# Patient Record
Sex: FEMALE | Race: WHITE | URBAN - METROPOLITAN AREA
[De-identification: names, ages, dates, MRNs, and addresses within clinical notes are randomized per-mention and may not be internally consistent; named-entity substitution may affect disease eponyms.]

---

## 2017-01-05 ENCOUNTER — COMMUNICATION - HEALTHEAST (OUTPATIENT)
Dept: ENDOCRINOLOGY | Facility: CLINIC | Age: 47
End: 2017-01-05

## 2017-01-11 ENCOUNTER — COMMUNICATION - HEALTHEAST (OUTPATIENT)
Dept: INTERNAL MEDICINE | Facility: CLINIC | Age: 47
End: 2017-01-11

## 2017-01-14 ENCOUNTER — COMMUNICATION - HEALTHEAST (OUTPATIENT)
Dept: INTERNAL MEDICINE | Facility: CLINIC | Age: 47
End: 2017-01-14

## 2017-01-31 ENCOUNTER — RECORDS - HEALTHEAST (OUTPATIENT)
Dept: ADMINISTRATIVE | Facility: OTHER | Age: 47
End: 2017-01-31

## 2017-02-02 ENCOUNTER — AMBULATORY - HEALTHEAST (OUTPATIENT)
Dept: NURSING | Facility: CLINIC | Age: 47
End: 2017-02-02

## 2017-02-02 DIAGNOSIS — D72.819 LEUKOPENIA, UNSPECIFIED TYPE: ICD-10-CM

## 2017-02-06 ENCOUNTER — COMMUNICATION - HEALTHEAST (OUTPATIENT)
Dept: INTERNAL MEDICINE | Facility: CLINIC | Age: 47
End: 2017-02-06

## 2017-02-06 DIAGNOSIS — Z79.890 HORMONE REPLACEMENT THERAPY (HRT): ICD-10-CM

## 2017-02-23 ENCOUNTER — COMMUNICATION - HEALTHEAST (OUTPATIENT)
Dept: SCHEDULING | Facility: CLINIC | Age: 47
End: 2017-02-23

## 2017-02-23 DIAGNOSIS — E53.8 FOLIC ACID DEFICIENCY: ICD-10-CM

## 2017-02-24 ENCOUNTER — COMMUNICATION - HEALTHEAST (OUTPATIENT)
Dept: INTERNAL MEDICINE | Facility: CLINIC | Age: 47
End: 2017-02-24

## 2017-02-24 DIAGNOSIS — E53.8 FOLIC ACID DEFICIENCY: ICD-10-CM

## 2017-03-28 ENCOUNTER — COMMUNICATION - HEALTHEAST (OUTPATIENT)
Dept: INTERNAL MEDICINE | Facility: CLINIC | Age: 47
End: 2017-03-28

## 2017-03-28 ENCOUNTER — AMBULATORY - HEALTHEAST (OUTPATIENT)
Dept: LAB | Facility: CLINIC | Age: 47
End: 2017-03-28

## 2017-03-28 DIAGNOSIS — Z79.01 LONG TERM (CURRENT) USE OF ANTICOAGULANTS: ICD-10-CM

## 2017-03-28 DIAGNOSIS — D64.9 ANEMIA, UNSPECIFIED: ICD-10-CM

## 2017-03-28 DIAGNOSIS — E11.9 TYPE 2 DIABETES MELLITUS (H): ICD-10-CM

## 2017-03-28 LAB — HBA1C MFR BLD: 7.5 % (ref 3.5–6)

## 2017-03-29 ENCOUNTER — COMMUNICATION - HEALTHEAST (OUTPATIENT)
Dept: INTERNAL MEDICINE | Facility: CLINIC | Age: 47
End: 2017-03-29

## 2017-03-31 LAB
ALBUMIN PERCENT: 59.8 % (ref 51–67)
ALBUMIN SERPL ELPH-MCNC: 4.1 G/DL (ref 3.2–4.7)
ALPHA 1 PERCENT: 2.6 % (ref 2–4)
ALPHA 2 PERCENT: 8.7 % (ref 5–13)
ALPHA1 GLOB SERPL ELPH-MCNC: 0.2 G/DL (ref 0.1–0.3)
ALPHA2 GLOB SERPL ELPH-MCNC: 0.6 G/DL (ref 0.4–0.9)
B-GLOBULIN SERPL ELPH-MCNC: 0.7 G/DL (ref 0.7–1.2)
BETA PERCENT: 9.9 % (ref 10–17)
GAMMA GLOB SERPL ELPH-MCNC: 1.3 G/DL (ref 0.6–1.4)
GAMMA GLOBULIN PERCENT: 19 % (ref 9–20)
M PROTEIN SERPL ELPH-MCNC: 0.5 G/DL
PATH ICD:: ABNORMAL
PATH ICD:: NORMAL
PROT PATTERN SERPL ELPH-IMP: ABNORMAL
PROT PATTERN SERPL IFE-IMP: NORMAL
PROT SERPL-MCNC: 6.9 G/DL (ref 6–8)
REVIEWING PATHOLOGIST: ABNORMAL
REVIEWING PATHOLOGIST: NORMAL

## 2017-04-03 ENCOUNTER — COMMUNICATION - HEALTHEAST (OUTPATIENT)
Dept: INTERNAL MEDICINE | Facility: CLINIC | Age: 47
End: 2017-04-03

## 2017-04-06 ENCOUNTER — COMMUNICATION - HEALTHEAST (OUTPATIENT)
Dept: INTERNAL MEDICINE | Facility: CLINIC | Age: 47
End: 2017-04-06

## 2017-04-10 ENCOUNTER — COMMUNICATION - HEALTHEAST (OUTPATIENT)
Dept: INTERNAL MEDICINE | Facility: CLINIC | Age: 47
End: 2017-04-10

## 2017-04-10 DIAGNOSIS — J30.9 ALLERGIC RHINITIS: ICD-10-CM

## 2017-04-14 ENCOUNTER — COMMUNICATION - HEALTHEAST (OUTPATIENT)
Dept: INTERNAL MEDICINE | Facility: CLINIC | Age: 47
End: 2017-04-14

## 2017-04-14 DIAGNOSIS — Z51.81 MEDICATION MONITORING ENCOUNTER: ICD-10-CM

## 2017-04-17 ENCOUNTER — COMMUNICATION - HEALTHEAST (OUTPATIENT)
Dept: INTERNAL MEDICINE | Facility: CLINIC | Age: 47
End: 2017-04-17

## 2017-04-20 ENCOUNTER — COMMUNICATION - HEALTHEAST (OUTPATIENT)
Dept: INTERNAL MEDICINE | Facility: CLINIC | Age: 47
End: 2017-04-20

## 2017-04-20 DIAGNOSIS — M06.9 RHEUMATOID ARTHRITIS (H): ICD-10-CM

## 2017-05-16 ENCOUNTER — COMMUNICATION - HEALTHEAST (OUTPATIENT)
Dept: INTERNAL MEDICINE | Facility: CLINIC | Age: 47
End: 2017-05-16

## 2017-05-16 ENCOUNTER — COMMUNICATION - HEALTHEAST (OUTPATIENT)
Dept: INFUSION THERAPY | Age: 47
End: 2017-05-16

## 2017-05-16 DIAGNOSIS — M81.0 OSTEOPOROSIS: ICD-10-CM

## 2017-06-01 ENCOUNTER — COMMUNICATION - HEALTHEAST (OUTPATIENT)
Dept: INTERNAL MEDICINE | Facility: CLINIC | Age: 47
End: 2017-06-01

## 2017-06-19 ENCOUNTER — COMMUNICATION - HEALTHEAST (OUTPATIENT)
Dept: INTERNAL MEDICINE | Facility: CLINIC | Age: 47
End: 2017-06-19

## 2017-07-03 ENCOUNTER — COMMUNICATION - HEALTHEAST (OUTPATIENT)
Dept: INTERNAL MEDICINE | Facility: CLINIC | Age: 47
End: 2017-07-03

## 2017-07-14 ENCOUNTER — COMMUNICATION - HEALTHEAST (OUTPATIENT)
Dept: INTERNAL MEDICINE | Facility: CLINIC | Age: 47
End: 2017-07-14

## 2017-07-14 DIAGNOSIS — K21.00 REFLUX ESOPHAGITIS: ICD-10-CM

## 2017-07-18 ENCOUNTER — AMBULATORY - HEALTHEAST (OUTPATIENT)
Dept: INFUSION THERAPY | Age: 47
End: 2017-07-18

## 2017-07-18 DIAGNOSIS — M81.0 OSTEOPOROSIS: ICD-10-CM

## 2017-07-21 ENCOUNTER — RECORDS - HEALTHEAST (OUTPATIENT)
Dept: ADMINISTRATIVE | Facility: OTHER | Age: 47
End: 2017-07-21

## 2017-07-24 ENCOUNTER — COMMUNICATION - HEALTHEAST (OUTPATIENT)
Dept: INTERNAL MEDICINE | Facility: CLINIC | Age: 47
End: 2017-07-24

## 2017-07-24 DIAGNOSIS — S89.91XA RIGHT KNEE INJURY, INITIAL ENCOUNTER: ICD-10-CM

## 2017-07-26 ENCOUNTER — RECORDS - HEALTHEAST (OUTPATIENT)
Dept: ADMINISTRATIVE | Facility: OTHER | Age: 47
End: 2017-07-26

## 2017-07-31 ENCOUNTER — RECORDS - HEALTHEAST (OUTPATIENT)
Dept: ADMINISTRATIVE | Facility: OTHER | Age: 47
End: 2017-07-31

## 2017-08-10 ENCOUNTER — COMMUNICATION - HEALTHEAST (OUTPATIENT)
Dept: INTERNAL MEDICINE | Facility: CLINIC | Age: 47
End: 2017-08-10

## 2017-08-10 DIAGNOSIS — E53.8 FOLIC ACID DEFICIENCY: ICD-10-CM

## 2017-08-16 ENCOUNTER — RECORDS - HEALTHEAST (OUTPATIENT)
Dept: MAMMOGRAPHY | Facility: CLINIC | Age: 47
End: 2017-08-16

## 2017-08-16 ENCOUNTER — RECORDS - HEALTHEAST (OUTPATIENT)
Dept: ADMINISTRATIVE | Facility: OTHER | Age: 47
End: 2017-08-16

## 2017-08-16 DIAGNOSIS — Z12.31 ENCOUNTER FOR SCREENING MAMMOGRAM FOR MALIGNANT NEOPLASM OF BREAST: ICD-10-CM

## 2017-08-22 ENCOUNTER — OFFICE VISIT - HEALTHEAST (OUTPATIENT)
Dept: INTERNAL MEDICINE | Facility: CLINIC | Age: 47
End: 2017-08-22

## 2017-08-22 ENCOUNTER — COMMUNICATION - HEALTHEAST (OUTPATIENT)
Dept: SCHEDULING | Facility: CLINIC | Age: 47
End: 2017-08-22

## 2017-08-22 ENCOUNTER — COMMUNICATION - HEALTHEAST (OUTPATIENT)
Dept: TELEHEALTH | Facility: CLINIC | Age: 47
End: 2017-08-22

## 2017-08-22 ENCOUNTER — AMBULATORY - HEALTHEAST (OUTPATIENT)
Dept: INTERNAL MEDICINE | Facility: CLINIC | Age: 47
End: 2017-08-22

## 2017-08-22 DIAGNOSIS — M06.9 RHEUMATOID ARTHRITIS (H): ICD-10-CM

## 2017-08-22 DIAGNOSIS — M81.0 OSTEOPOROSIS: ICD-10-CM

## 2017-08-22 DIAGNOSIS — M35.89: ICD-10-CM

## 2017-08-22 DIAGNOSIS — Z90.710 S/P TOTAL HYSTERECTOMY: ICD-10-CM

## 2017-08-22 DIAGNOSIS — K21.00 REFLUX ESOPHAGITIS: ICD-10-CM

## 2017-08-22 DIAGNOSIS — D69.6 THROMBOCYTOPENIA, UNSPECIFIED (H): ICD-10-CM

## 2017-08-22 DIAGNOSIS — I27.89 OTHER CHRONIC PULMONARY HEART DISEASES: ICD-10-CM

## 2017-08-22 DIAGNOSIS — J30.9 ALLERGIC RHINITIS: ICD-10-CM

## 2017-08-22 DIAGNOSIS — G47.00 INSOMNIA: ICD-10-CM

## 2017-08-22 DIAGNOSIS — D64.9 ANEMIA, UNSPECIFIED: ICD-10-CM

## 2017-08-22 DIAGNOSIS — D72.819 LEUKOPENIA, UNSPECIFIED TYPE: ICD-10-CM

## 2017-08-22 DIAGNOSIS — Z79.01 LONG TERM (CURRENT) USE OF ANTICOAGULANTS: ICD-10-CM

## 2017-08-22 DIAGNOSIS — E11.9 TYPE 2 DIABETES MELLITUS (H): ICD-10-CM

## 2017-08-22 DIAGNOSIS — Z79.890 HORMONE REPLACEMENT THERAPY (HRT): ICD-10-CM

## 2017-08-22 DIAGNOSIS — E53.8 FOLIC ACID DEFICIENCY: ICD-10-CM

## 2017-08-22 DIAGNOSIS — Z51.81 MEDICATION MONITORING ENCOUNTER: ICD-10-CM

## 2017-08-22 LAB — HBA1C MFR BLD: 7 % (ref 3.5–6)

## 2017-08-23 LAB
CHOLEST SERPL-MCNC: 167 MG/DL
HDLC SERPL-MCNC: 45 MG/DL
LDLC SERPL CALC-MCNC: 68 MG/DL
TRIGL SERPL-MCNC: 271 MG/DL

## 2017-08-24 ENCOUNTER — COMMUNICATION - HEALTHEAST (OUTPATIENT)
Dept: INTERNAL MEDICINE | Facility: CLINIC | Age: 47
End: 2017-08-24

## 2017-08-24 ENCOUNTER — RECORDS - HEALTHEAST (OUTPATIENT)
Dept: ADMINISTRATIVE | Facility: OTHER | Age: 47
End: 2017-08-24

## 2017-08-24 ENCOUNTER — INFUSION - HEALTHEAST (OUTPATIENT)
Dept: INFUSION THERAPY | Age: 47
End: 2017-08-24

## 2017-08-24 DIAGNOSIS — M81.0 OSTEOPOROSIS: ICD-10-CM

## 2017-08-29 ENCOUNTER — COMMUNICATION - HEALTHEAST (OUTPATIENT)
Dept: INTERNAL MEDICINE | Facility: CLINIC | Age: 47
End: 2017-08-29

## 2017-08-29 ENCOUNTER — RECORDS - HEALTHEAST (OUTPATIENT)
Dept: ADMINISTRATIVE | Facility: OTHER | Age: 47
End: 2017-08-29

## 2017-09-13 ENCOUNTER — RECORDS - HEALTHEAST (OUTPATIENT)
Dept: ADMINISTRATIVE | Facility: OTHER | Age: 47
End: 2017-09-13

## 2017-09-14 ENCOUNTER — COMMUNICATION - HEALTHEAST (OUTPATIENT)
Dept: INTERNAL MEDICINE | Facility: CLINIC | Age: 47
End: 2017-09-14

## 2017-09-16 ENCOUNTER — COMMUNICATION - HEALTHEAST (OUTPATIENT)
Dept: INTERNAL MEDICINE | Facility: CLINIC | Age: 47
End: 2017-09-16

## 2017-09-16 DIAGNOSIS — I10 HTN (HYPERTENSION): ICD-10-CM

## 2017-10-23 ENCOUNTER — COMMUNICATION - HEALTHEAST (OUTPATIENT)
Dept: INTERNAL MEDICINE | Facility: CLINIC | Age: 47
End: 2017-10-23

## 2017-10-28 ENCOUNTER — COMMUNICATION - HEALTHEAST (OUTPATIENT)
Dept: INTERNAL MEDICINE | Facility: CLINIC | Age: 47
End: 2017-10-28

## 2017-10-28 DIAGNOSIS — J30.9 ALLERGIC RHINITIS: ICD-10-CM

## 2017-11-02 ENCOUNTER — RECORDS - HEALTHEAST (OUTPATIENT)
Dept: ADMINISTRATIVE | Facility: OTHER | Age: 47
End: 2017-11-02

## 2017-12-05 ENCOUNTER — THERAPY VISIT (OUTPATIENT)
Dept: PHYSICAL THERAPY | Facility: CLINIC | Age: 47
End: 2017-12-05
Payer: COMMERCIAL

## 2017-12-05 DIAGNOSIS — M25.561 RIGHT KNEE PAIN: Primary | ICD-10-CM

## 2017-12-05 DIAGNOSIS — S82.143A TIBIAL PLATEAU FRACTURE: ICD-10-CM

## 2017-12-05 PROCEDURE — 97161 PT EVAL LOW COMPLEX 20 MIN: CPT | Mod: GP | Performed by: PHYSICAL THERAPIST

## 2017-12-05 PROCEDURE — 97110 THERAPEUTIC EXERCISES: CPT | Mod: GP | Performed by: PHYSICAL THERAPIST

## 2017-12-05 ASSESSMENT — ACTIVITIES OF DAILY LIVING (ADL)
SIT WITH YOUR KNEE BENT: ACTIVITY IS NOT DIFFICULT
GO UP STAIRS: ACTIVITY IS MINIMALLY DIFFICULT
RISE FROM A CHAIR: ACTIVITY IS NOT DIFFICULT
HOW_WOULD_YOU_RATE_THE_OVERALL_FUNCTION_OF_YOUR_KNEE_DURING_YOUR_USUAL_DAILY_ACTIVITIES?: NORMAL
WEAKNESS: THE SYMPTOM AFFECTS MY ACTIVITY SLIGHTLY
KNEE_ACTIVITY_OF_DAILY_LIVING_SUM: 63
WALK: ACTIVITY IS NOT DIFFICULT
LIMPING: I DO NOT HAVE THE SYMPTOM
STAND: ACTIVITY IS NOT DIFFICULT
HOW_WOULD_YOU_RATE_THE_CURRENT_FUNCTION_OF_YOUR_KNEE_DURING_YOUR_USUAL_DAILY_ACTIVITIES_ON_A_SCALE_FROM_0_TO_100_WITH_100_BEING_YOUR_LEVEL_OF_KNEE_FUNCTION_PRIOR_TO_YOUR_INJURY_AND_0_BEING_THE_INABILITY_TO_PERFORM_ANY_OF_YOUR_USUAL_DAILY_ACTIVITIES?: 90
GIVING WAY, BUCKLING OR SHIFTING OF KNEE: I DO NOT HAVE THE SYMPTOM
AS_A_RESULT_OF_YOUR_KNEE_INJURY,_HOW_WOULD_YOU_RATE_YOUR_CURRENT_LEVEL_OF_DAILY_ACTIVITY?: NEARLY NORMAL
KNEE_ACTIVITY_OF_DAILY_LIVING_SCORE: 90
KNEEL ON THE FRONT OF YOUR KNEE: ACTIVITY IS NOT DIFFICULT
SWELLING: I DO NOT HAVE THE SYMPTOM
SQUAT: ACTIVITY IS SOMEWHAT DIFFICULT
GO DOWN STAIRS: ACTIVITY IS NOT DIFFICULT
RAW_SCORE: 63
STIFFNESS: THE SYMPTOM AFFECTS MY ACTIVITY SLIGHTLY
PAIN: I DO NOT HAVE THE SYMPTOM

## 2017-12-05 NOTE — MR AVS SNAPSHOT
"              After Visit Summary   12/5/2017    Sia Thomas    MRN: [id number]           Patient Information     Date Of Birth          1970        Visit Information        Provider Department      12/5/2017 2:00 PM Fermin Engle, PT [facility] Physical Therapy        Today's Diagnoses     Right knee pain    -  1    Tibial plateau fracture           Follow-ups after your visit        Your next 10 appointments already scheduled     Jan 09, 2018  3:20 PM CST   NIKITA Extremity with Fermin Engle PT   [facility] ([facility]  )    [address] [id number] [phone]              Who to contact     If you have questions or need follow up information about today's clinic visit or your schedule please contact [facility] directly at [phone].  Normal or non-critical lab and imaging results will be communicated to you by MyChart, letter or phone within 4 business days after the clinic has received the results. If you do not hear from us within 7 days, please contact the clinic through [employer] or phone. If you have a critical or abnormal lab result, we will notify you by phone as soon as possible.  Submit refill requests through [employer] or call your pharmacy and they will forward the refill request to us. Please allow 3 business days for your refill to be completed.          Additional Information About Your Visit        MyChart Information     [employer] lets you send messages to your doctor, view your test results, renew your prescriptions, schedule appointments and more. To sign up, go to [email or web address] . Click on \"Log in\" on the left side of the screen, which will take you to the Welcome page. Then click on \"Sign up Now\" on the right side of the page.     You will be asked to enter the access code listed below, as well as some personal " information. Please follow the directions to create your username and password.     Your access code is: [email or web address]  Expires: 3/6/2018  3:24 PM     Your access code will  in 90 days. If you need help or a new code, please call your [facility] clinic or [phone].        Care EveryWhere ID     This is your Care EveryWhere ID. This could be used by other organizations to access your [facility] medical records  [id number]         Blood Pressure from Last 3 Encounters:   07 92/66    Weight from Last 3 Encounters:   07 59 kg (130 lb)              We Performed the Following     HC PT EVAL, LOW COMPLEXITY     NIKITA INITIAL EVAL REPORT     THERAPEUTIC EXERCISES        Primary Care Provider Fax #    Physician No Ref-Primary [phone]       No address on file        Equal Access to Services     NATAN CARD : Mia Holland, waaxsweta luqadaha, qaybta kaalmada jl, yessenia marks . So [facility] [phone].    ATENCIÓN: Si habla español, tiene a vergara disposición servicios gratuitos de asistencia lingüística. Llame al [phone].    We comply with applicable federal civil rights laws and [place] laws. We do not discriminate on the basis of race, color, national origin, age, disability, sex, sexual orientation, or gender identity.            Thank you!     Thank you for choosing [facility] PHYSICAL THERAPY  for your care. Our goal is always to provide you with excellent care. Hearing back from our patients is one way we can continue to improve our services. Please take a few minutes to complete the written survey that you may receive in the mail after your visit with us. Thank you!             Your Updated Medication List - Protect others around you: Learn how to safely use, store and throw away your medicines at www.disposemymeds.org.          This list is accurate as of: 17 11:59 PM.  Always use your most recent med list.                    Brand Name Dispense Instructions for use Diagnosis    ALLEGRA 60 MG tablet   Generic drug:  fexofenadine      1 TABLET TWICE DAILY        COUMADIN 2 MG tablet   Generic drug:  warfarin      1 TABLET DAILY        FOLIC ACID      None Entered        FORTEO 750 MCG/3ML injection   Generic drug:  teriparatide      20 MCG DAILY        IMURAN PO      1 TABLET TWICE DAILY        Iron 50 MG Tabs      1 TABLET DAILY        KLOR-CON 10 MEQ tablet   Generic drug:  potassium chloride      1 TABLET 3 TIMES DAILY        LASIX PO      1 TABLET DAILY        METHADONE      None Entered        NEUPOGEN IJ      675 MCG DAILY        NEURONTIN 300 MG capsule   Generic drug:  gabapentin      1 CAPSULE 3 TIMES DAILY        OS-KELLY 500 + D PO      None Entered        oxyCODONE IR 5 MG tablet    ROXICODONE     1 TABLET EVERY 4 TO 6 HOURS AS NEEDED        PLAQUENIL 200 MG tablet   Generic drug:  hydroxychloroquine      1 TABLET EVERY 7 DAYS        PREDNISONE      None Entered        PREVACID 30 MG CR capsule   Generic drug:  LANsoprazole      1 CAPSULE DAILY BEFORE EATING        TRACLEER 125 MG tablet   Generic drug:  bosentan      1 TABLET TWICE DAILY        TRAZODONE HCL      None Entered        VENTAVIS 10 MCG/ML Soln solution   Generic drug:  iloprost      None Entered        VIAGRA 50 MG tablet   Generic drug:  sildenafil      1 TABLET DAILY AS NEEDED

## 2017-12-05 NOTE — LETTER
[facility]  [address] [address]  [phone]    2017    Re: Sia Thomas   :   1970  MRN:  [id number]   REFERRING PHYSICIAN:   Spencer Bauer    [facility]    Date of Initial Evaluation:  2017  Visits:  1  Rxs Used: 1  Reason for Referral:     Right knee pain  Tibial plateau fracture    [facility] Initial Evaluation    Subjective:  Pt presents to PT with a chief complaint of R knee pain and weakness with reported onset on 17 after falling on the stairs which resulted in a tibial plateau fracture. Pt reportedly used a FWW for 8 weeks and has been gradually increasing her walking duration and biking over the past couple months. Pt reports difficulty with stair descent and squatting, but would like to primarily work on developing an independent HEP.   Patient is a 47 year old female presenting with rehab right knee hpi.   Sia Thomas is a 47 year old female with a right knee condition.  Condition occurred with:  A fall/slip.  Condition occurred: at home.  This is a chronic condition  17.    Patient reports pain:  Anterior.    Pain is described as aching and is intermittent and reported as 1/10.  Associated symptoms:  Loss of motion/stiffness. Pain is worse in the P.M..  Symptoms are exacerbated by ascending stairs, descending stairs and bending/squatting and relieved by rest.  Since onset symptoms are gradually improving.  Special tests:  X-ray.      General health as reported by patient is fair.  Pertinent medical history includes:  Osteoporosis and overweight.  Medical allergies: yes (see chart).  Other surgeries include:  None reported.  Medication history: see chart.  Current occupation is .  Patient is working in normal job without restrictions.  Primary job tasks include:  Prolonged sitting.  Barriers  include:  None as reported by the patient.  Red flags:  None as reported by the patient.               Objective:  Hip Evaluation  Hip Strength:    Extension:  Left: 4-/5  Pain:Right: 4-/5    Pain:    Abduction:  Left: 4-/5     Pain:Right: 3+/5    Pain:  Knee Evaluation:  ROM:    PROM  Hyperextension: Left: 5    Right:  5    Re: Sia Thomas   :   1970    Extension: Left: 0    Right:  0  Flexion: Left: 135    Right:  130  Pain: Min pain/tightness with end range knee flexion  Strength:   Extension:  Left: 5/5   Pain:      Right: 5-/5    Pain:+  Flexion:  Left: 5/5   Pain:      Right: 5/5   Pain:    Quad Set Left: Good    Pain:   Quad Set Right: Good    Pain:  Palpation:    Right knee tenderness present at:  Patellar Tendon  Functional Testing:    Quad:    Single Leg Squat:  Left:      Mild loss of control, femoral IR and hip substitution  Right:       Moderate loss of control, femoral IR and hip substitution  Bilateral Leg Squat:        Assessment/Plan:    Patient is a 47 year old female with right side knee complaints.    Patient has the following significant findings with corresponding treatment plan.                Diagnosis 1:  R tibial plateau fracture    Decreased ROM/flexibility - manual therapy and therapeutic exercise  Decreased strength - therapeutic exercise and therapeutic activities  Impaired muscle performance - neuro re-education  Therapy Evaluation Codes:   1) History comprised of:   Personal factors that impact the plan of care:      None.    Comorbidity factors that impact the plan of care are:      Osteoporosis.     Medications impacting care: None.  2) Examination of Body Systems comprised of:   Body structures and functions that impact the plan of care:      Knee.   Activity limitations that impact the plan of care are:      Lifting, Squatting/kneeling, Stairs and Walking.  3) Clinical presentation characteristics are:   Stable/Uncomplicated.  4) Decision-Making    Low complexity using  standardized patient assessment instrument and/or measureable assessment of functional outcome.  Cumulative Therapy Evaluation is: Low complexity.  Previous and current functional limitations:  (See Goal Flow Sheet for this information)    Short term and Long term goals: (See Goal Flow Sheet for this information)   Communication ability:  Patient appears to be able to clearly communicate and understand verbal and written communication and follow directions correctly.  Treatment Explanation - The following has been discussed with the patient:   RX ordered/plan of care  Anticipated outcomes  Re: Sia Thomas   :   1970    Possible risks and side effects  This patient would benefit from PT intervention to resume normal activities.   Rehab potential is good.  Frequency:  1 X week, once daily  Duration:  for 2 weeks tapering to 2 X a month over 1 month  Discharge Plan:  Achieve all LTG.  Independent in home treatment program.  Reach maximal therapeutic benefit.              Thank you for your referral.    INQUIRIES  Therapist: Fermin Engle PT   [facility]  [address] [place] [id number]  Phone: [phone]  Fax: [phone]

## 2017-12-05 NOTE — PROGRESS NOTES
[facility] Initial Evaluation    Subjective:  Pt presents to PT with a chief complaint of R knee pain and weakness with reported onset on 7/18/17 after falling on the stairs which resulted in a tibial plateau fracture. Pt reportedly used a FWW for 8 weeks and has been gradually increasing her walking duration and biking over the past couple months. Pt reports difficulty with stair descent and squatting, but would like to primarily work on developing an independent HEP.      Patient is a 47 year old female presenting with rehab right knee hpi.   Sia Thomas is a 47 year old female with a right knee condition.  Condition occurred with:  A fall/slip.  Condition occurred: at home.  This is a chronic condition  7/18/17.    Patient reports pain:  Anterior.    Pain is described as aching and is intermittent and reported as 1/10.  Associated symptoms:  Loss of motion/stiffness. Pain is worse in the P.M..  Symptoms are exacerbated by ascending stairs, descending stairs and bending/squatting and relieved by rest.  Since onset symptoms are gradually improving.  Special tests:  X-ray.      General health as reported by patient is fair.  Pertinent medical history includes:  Osteoporosis and overweight.  Medical allergies: yes (see chart).  Other surgeries include:  None reported.  Medication history: see chart.  Current occupation is .  Patient is working in normal job without restrictions.  Primary job tasks include:  Prolonged sitting.    Barriers include:  None as reported by the patient.    Red flags:  None as reported by the patient.                        Objective:    System                                           Hip Evaluation    Hip Strength:      Extension:  Left: 4-/5  Pain:Right: 4-/5    Pain:    Abduction:  Left: 4-/5     Pain:Right: 3+/5    Pain:                           Knee Evaluation:  ROM:      PROM    Hyperextension: Left: 5    Right:  5  Extension: Left: 0    Right:   0  Flexion: Left: 135    Right:  130  Pain: Min pain/tightness with end range knee flexion    Strength:     Extension:  Left: 5/5   Pain:      Right: 5-/5    Pain:+  Flexion:  Left: 5/5   Pain:      Right: 5/5   Pain:    Quad Set Left: Good    Pain:   Quad Set Right: Good    Pain:      Palpation:      Right knee tenderness present at:  Patellar Tendon      Functional Testing:          Quad:    Single Leg Squat:  Left:      Mild loss of control, femoral IR and hip substitution  Right:       Moderate loss of control, femoral IR and hip substitution  Bilateral Leg Squat:                General     ROS    Assessment/Plan:      Patient is a 47 year old female with right side knee complaints.    Patient has the following significant findings with corresponding treatment plan.                Diagnosis 1:  R tibial plateau fracture    Decreased ROM/flexibility - manual therapy and therapeutic exercise  Decreased strength - therapeutic exercise and therapeutic activities  Impaired muscle performance - neuro re-education    Therapy Evaluation Codes:   1) History comprised of:   Personal factors that impact the plan of care:      None.    Comorbidity factors that impact the plan of care are:      Osteoporosis.     Medications impacting care: None.  2) Examination of Body Systems comprised of:   Body structures and functions that impact the plan of care:      Knee.   Activity limitations that impact the plan of care are:      Lifting, Squatting/kneeling, Stairs and Walking.  3) Clinical presentation characteristics are:   Stable/Uncomplicated.  4) Decision-Making    Low complexity using standardized patient assessment instrument and/or measureable assessment of functional outcome.  Cumulative Therapy Evaluation is: Low complexity.    Previous and current functional limitations:  (See Goal Flow Sheet for this information)    Short term and Long term goals: (See Goal Flow Sheet for this information)     Communication ability:   Patient appears to be able to clearly communicate and understand verbal and written communication and follow directions correctly.  Treatment Explanation - The following has been discussed with the patient:   RX ordered/plan of care  Anticipated outcomes  Possible risks and side effects  This patient would benefit from PT intervention to resume normal activities.   Rehab potential is good.    Frequency:  1 X week, once daily  Duration:  for 2 weeks tapering to 2 X a month over 1 month  Discharge Plan:  Achieve all LTG.  Independent in home treatment program.  Reach maximal therapeutic benefit.    Please refer to the daily flowsheet for treatment today, total treatment time and time spent performing 1:1 timed codes.

## 2017-12-06 PROBLEM — M25.561 RIGHT KNEE PAIN: Status: ACTIVE | Noted: 2017-12-06

## 2017-12-06 PROBLEM — S82.143A TIBIAL PLATEAU FRACTURE: Status: ACTIVE | Noted: 2017-12-06

## 2018-07-23 ENCOUNTER — RECORDS - HEALTHEAST (OUTPATIENT)
Dept: ADMINISTRATIVE | Facility: OTHER | Age: 48
End: 2018-07-23

## 2019-04-09 ENCOUNTER — RECORDS - HEALTHEAST (OUTPATIENT)
Dept: ADMINISTRATIVE | Facility: OTHER | Age: 49
End: 2019-04-09

## 2020-11-08 ENCOUNTER — HEALTH MAINTENANCE LETTER (OUTPATIENT)
Age: 50
End: 2020-11-08

## 2021-05-27 ENCOUNTER — RECORDS - HEALTHEAST (OUTPATIENT)
Dept: ADMINISTRATIVE | Facility: CLINIC | Age: 51
End: 2021-05-27

## 2021-05-31 VITALS — WEIGHT: 152.6 LBS

## 2021-06-01 ENCOUNTER — RECORDS - HEALTHEAST (OUTPATIENT)
Dept: ADMINISTRATIVE | Facility: CLINIC | Age: 51
End: 2021-06-01

## 2021-06-02 ENCOUNTER — RECORDS - HEALTHEAST (OUTPATIENT)
Dept: ADMINISTRATIVE | Facility: CLINIC | Age: 51
End: 2021-06-02

## 2021-06-08 NOTE — PROGRESS NOTES
Zarxio is a biosimilar to Neupogen and preferred by Sia's insurance. I have sent a new rx with the equivalent dosing to her pharmacy.

## 2021-06-12 NOTE — PROGRESS NOTES
Arrived for yearly Reclast, this will be her 4th dose. Pt states she did have what is called a  plateau fx of tibia about 6 weeks ago but it has completely healed and that is why she had to postpone her Reclast and her physician told her it was fine now to have this infusion. No leg pain, just some muscle aching at times. Denies any recent major dental work and none planned.No signs of ONJ ( this rare side effect again reviewed with pt) , she declines any written information today on Reclast as she has this at home. Has been hydrating well and using some Tylenol the past couple days just to make sure she has no aching with Reclast infusion. Labs noted. VSS. Reclast infused and this was well tolerated while here.Pt reminded to drink extra fluids today and to call dentist and md if any jaw pain or other concerns. Left amb.

## 2021-06-12 NOTE — PROGRESS NOTES
[facility] Follow Up Note  Assessment/Plan  1. Thrombocytopenia     2. Allergic rhinitis  cetirizine (ZYRTEC) 10 MG tablet    azelastine (ASTELIN) 137 mcg (0.1 %) nasal spray   3. Hormone replacement therapy (HRT)  estradiol (VIVELLE-DOT) 0.025 mg/24 hr   4. Leukopenia, unspecified type  filgrastim-sndz (ZARXIO) 300 mcg/0.5 mL Syrg   5. Folic acid deficiency  folic acid (FOLVITE) 1 MG tablet   6. Rheumatoid arthritis  hydroxychloroquine (PLAQUENIL) 200 mg tablet   7. Chronic Reflux Esophagitis  lansoprazole (PREVACID) 30 MG capsule   8. Secondary pulmonary hypertension  potassium chloride SA (K-DUR,KLOR-CON) 20 MEQ tablet   9. Medication monitoring encounter  predniSONE (DELTASONE) 5 MG tablet   10. Insomnia  traZODone (DESYREL) 100 MG tablet   11. Osteoporosis     12. Secondary Pulmonary Hypertension     13. Type 2 diabetes mellitus  Comprehensive Metabolic Panel    Glycosylated Hemoglobin A1c    Microalbumin, Random Urine   14. Anemia  HM1(CBC and Differential)    HM1 (CBC with Diff)   15. Mixed Connective Tissue Disease     16. Long term (current) use of anticoagulants     17. S/P total hysterectomy         Patient Instructions   1. Medications were reviewed and medication refills completed if requested.   A corrected Medication List is listed below on this After Visit    Summary.   New Medications, Refilled medications or Discontinued medications are also listed below.      2. Immunizations were reviewed and updated as necessary.   All up to date  3. If labs were done today, they will either be mailed to you or released to My Chart online if that has been activated.  Factor X and other labs today  4. We discussed today that the patient will need to get a new primary care provider after September 1, 2017.  Dr. Candis Marie    5.  Through her medications line by line with her and we renewed all of them for 90 days with 3 refills.  6.  Been taking lansoprazole 4 times daily which is not appropriate  and so she will take it twice daily and add on Zantac 300 mg at at bedtime for her heartburn.       MORE THAN 40 MINUTES WAS SPENT WITH THE PATIENT TODAY OF WHICH GREATER THAN 50% WAS SPENT IN COUNSELING AND COORDINATION OF CARE -  DISCUSSING THE AFOREMENTIONED DIAGNOSES, TREATMENT, AND PLAN.       There is no height or weight on file to calculate BMI.    Cl Avilez MD  [facility] [place] [address]  Voice: [phone]  Fax: [phone]    +++++++++++++++++++++++++++++++++++++++    Sia Thomas   47 y.o. female    Date of Visit: 8/22/2017    Chief Complaint   Patient presents with     Follow-up     medication f/u     Subjective  1. Health Maintenance  -immunizations are up-to-date.  Inquiring about Pap smear which are no longer necessary since she is status post hysterectomy.  She has intact ovaries but is generally not considered necessary to have a bimanual pelvic exam.     2.  Type 2 diabetes/hyperlipidemia - patient has steroid-induced diabetes and has had an A1c in the 7.5 range.  We will check that again today along with microalbumin.  She had been in denial about the presence of diabetes but in fact it is real.  I think she accepts that now.  Earlier this year or late last year she started on atorvastatin will check a lipid panel today.    3.  Pancytopenia -patient has mixed connective tissue disease and pancytopenia and has been treated with Neupogen before and is now taking Zarxio, a different brand and so we updated her refills and updated her medication list.  She always runs extremely low white counts but is been fortunate not to be bothered by significant problems with pneumonia or overwhelming infection.  Generally her hemoglobin is okay but her platelets are also moderately low but again no bleeding problems.    4. Long Term anticoagulation- her long-term anticoagulation is monitored by factor X levels which we will do  today.    5. Severe GERD  - she is bothered by severe GERD and has dialed herself up to 4 times daily lansoprazole 30 mg which is not appropriate.  If lansoprazole 30 mg twice daily does not work then she should add on Zantac and I will send a new prescription for that.    6.  Pulmonary Hypertension -is followed by the [facility] cardiovascular team for this and remains on chronic Letairis.      ROS A comprehensive review of systems was performed and was negative other than the problems noted above.    Medications, allergies, and problem list were reviewed and updated    Past Medical History:   Diagnosis Date     Acid reflux      Anemia      Auto immune neutropenia      Back pain     nerve back pain     Chronic kidney disease      Compressed vertebrae     10 collapsed vertebrae in      Diabetes mellitus      Enlarged parotid gland      Foot pain     nerve foot pain     GERD (gastroesophageal reflux disease)      Jaw pain      Osteoporosis      Pulmonary hypertension      Rosacea      Past Surgical History:   Procedure Laterality Date     BACK SURGERY       BREAST BIOPSY Bilateral     benign      HYSTERECTOMY       LAPAROSCOPIC CHOLECYSTECTOMY  2010     IN BIOPSY OF BREAST, INCISIONAL      Description: Biopsy Breast Open;  Recorded: 2010;     IN  DELIVERY ONLY      Description:  Section;  Recorded: 2008;     IN TOTAL ABDOM HYSTERECTOMY      Description: Hysterectomy;  Proc Date: 10/31/2006;     IN TOTAL ABDOM HYSTERECTOMY      Description: Total Abdominal Hysterectomy;  Recorded: 2013;     UPPER GASTROINTESTINAL ENDOSCOPY       Social History     Social History     Marital status:      Spouse name: N/A     Number of children: N/A     Years of education: N/A     Occupational History     Not on file.     Social History Main Topics     Smoking status: Never Smoker     Smokeless tobacco: Not on file     Alcohol use No     Drug use: No     Sexual activity: No      Other Topics Concern     Not on file     Social History Narrative     Family History   Problem Relation Age of Onset     Breast cancer Maternal Grandmother        Current Outpatient Prescriptions   Medication Sig Dispense Refill     ambrisentan (LETAIRIS) 5 MG tablet Take 1 tablet (5 mg total) by mouth daily. 90 tablet 3     atorvastatin (LIPITOR) 10 MG tablet Take 1 tablet (10 mg total) by mouth daily. 90 tablet 3     azaTHIOprine (IMURAN) 50 mg tablet TAKE 2 TABLETS BY MOUTH DAILY 180 tablet 3     azelastine (ASTELIN) 137 mcg (0.1 %) nasal spray 2 SPRAYS INTO EACH NOSTRIL 2 (TWO) TIMES A DAY. USE IN EACH NOSTRIL AS DIRECTED 30 mL 2     cetirizine (ZYRTEC) 10 MG tablet TAKE 1 TABLET BY MOUTH DAILY 90 tablet 3     CHOLECALCIFEROL, VITAMIN D3, (VITAMIN D3 ORAL) Take 1,000 Units by mouth daily.       ergocalciferol (VITAMIN D2) 50,000 unit capsule Take 1 capsule (50,000 Units total) by mouth once a week. 12 capsule 1     estradiol (VIVELLE-DOT) 0.025 mg/24 hr PLACE 1 PATCH ON THE SKIN TWICE WEEKLY 24 patch 3     ferrous sulfate dried (SLOW FE) 160 mg (50 mg iron) TbER Take 160 mg by mouth daily with breakfast.       [START ON 8/23/2017] filgrastim-sndz (ZARXIO) 300 mcg/0.5 mL Syrg Inject 300 mcg as directed 3 (three) times a week. 6 mL 11     folic acid (FOLVITE) 1 MG tablet TAKE 2 TABLETS (2MG) BY MOUTH DAILY. 180 tablet 3     furosemide (LASIX) 40 MG tablet Take 2 tablets daily 180 tablet 3     gabapentin (NEURONTIN) 300 MG capsule Take 1 capsule (300 mg total) by mouth 3 (three) times a day. 270 capsule 3     hydroxychloroquine (PLAQUENIL) 200 mg tablet TAKE 1 TABLET BY MOUTH TWICE DAILY. 180 tablet 2     lansoprazole (PREVACID) 30 MG capsule TAKE 1 CAPSULE BY MOUTH TWICE DAILY 180 capsule 3     metFORMIN (GLUCOPHAGE-XR) 500 MG 24 hr tablet TAKE 1 TABLET (500 MG TOTAL) BY MOUTH DAILY BEFORE SUPPER. 90 tablet 3     METHADONE HCL (METHADONE ORAL) Take 5 mg by mouth every 6 (six) hours.       multivitamin capsule  Take 1 capsule by mouth daily.       OMEGA-3/DHA/EPA/FISH OIL (FISH OIL-OMEGA-3 FATTY ACIDS) 300-1,000 mg capsule Take 1 g by mouth daily.       potassium chloride SA (K-DUR,KLOR-CON) 20 MEQ tablet TAKE 6 TABLETS BY MOUTH DAILY 540 tablet 3     predniSONE (DELTASONE) 5 MG tablet TAKE 1 TABLET BY MOUTH DAILY 90 tablet PRN     sucralfate (CARAFATE) 1 gram tablet TAKE ONE (1) TABLET BY MOUTH FOUR TIMES DAILY AS NEEDED 120 tablet 11     tadalafil, antihypertensive, (ADCIRCA) 20 mg Tab Take 20 mg by mouth 2 (two) times a day. 180 tablet 3     traZODone (DESYREL) 100 MG tablet TAKE 2 TABLETS BY MOUTH EACH NIGHT AT BEDTIME 180 tablet 3     warfarin (COUMADIN) 1 MG tablet TAKE AS DIRECTED BASED ON INR/FACTOR X RESULTS. 90 tablet PRN     warfarin (COUMADIN) 5 MG tablet TAKE 1 TABLET BY MOUTH DAILY OR AS DIRECTED. 90 tablet 3     No current facility-administered medications for this visit.        Allergies   Allergen Reactions     Desflurane Shortness Of Breath     Pt was told never to take this again for anesthia     Amoxicillin-Pot Clavulanate Hives     Clindamycin Hives     Epoprostenol Other (See Comments)     FLOLAN - INTOLERANT              systemic hypotension     Iodinated Contrast- Oral And Iv Dye Hives     Sulfa (Sulfonamide Antibiotics) Hives       Exam  Vitals:    08/22/17 1524   BP: 106/58   Pulse: 70     The patient is well-groomed and well-dressed alert and oriented ×3.   Head: Negative  HEENT: Normal  Extremities: No peripheral edema.  Pulses are normal and symmetrical.  Neuro: Gait and mentation normal. Cranial Nerves intact.            Cl Avilez MD

## 2021-07-13 ENCOUNTER — RECORDS - HEALTHEAST (OUTPATIENT)
Dept: ADMINISTRATIVE | Facility: CLINIC | Age: 51
End: 2021-07-13

## 2021-07-22 ENCOUNTER — RECORDS - HEALTHEAST (OUTPATIENT)
Dept: INTERNAL MEDICINE | Facility: CLINIC | Age: 51
End: 2021-07-22

## 2021-07-22 DIAGNOSIS — Z12.31 OTHER SCREENING MAMMOGRAM: ICD-10-CM

## 2021-09-11 ENCOUNTER — HEALTH MAINTENANCE LETTER (OUTPATIENT)
Age: 51
End: 2021-09-11

## 2022-01-01 ENCOUNTER — HEALTH MAINTENANCE LETTER (OUTPATIENT)
Age: 52
End: 2022-01-01

## 2022-10-29 ENCOUNTER — HEALTH MAINTENANCE LETTER (OUTPATIENT)
Age: 52
End: 2022-10-29

## 2023-04-08 ENCOUNTER — HEALTH MAINTENANCE LETTER (OUTPATIENT)
Age: 53
End: 2023-04-08